# Patient Record
Sex: FEMALE | Race: ASIAN | NOT HISPANIC OR LATINO
[De-identification: names, ages, dates, MRNs, and addresses within clinical notes are randomized per-mention and may not be internally consistent; named-entity substitution may affect disease eponyms.]

---

## 2017-06-27 ENCOUNTER — APPOINTMENT (OUTPATIENT)
Dept: PULMONOLOGY | Facility: CLINIC | Age: 37
End: 2017-06-27

## 2017-06-27 VITALS
TEMPERATURE: 98.4 F | WEIGHT: 133 LBS | HEART RATE: 87 BPM | BODY MASS INDEX: 22.16 KG/M2 | SYSTOLIC BLOOD PRESSURE: 102 MMHG | OXYGEN SATURATION: 94 % | HEIGHT: 65 IN | DIASTOLIC BLOOD PRESSURE: 82 MMHG

## 2017-06-29 RX ORDER — MOMETASONE FUROATE AND FORMOTEROL FUMARATE DIHYDRATE 100; 5 UG/1; UG/1
100-5 AEROSOL RESPIRATORY (INHALATION)
Qty: 1 | Refills: 0 | Status: DISCONTINUED | COMMUNITY
Start: 2017-06-27 | End: 2017-06-29

## 2017-06-29 RX ORDER — ALBUTEROL SULFATE 108 UG/1
108 (90 BASE) AEROSOL, METERED RESPIRATORY (INHALATION) EVERY 6 HOURS
Qty: 1 | Refills: 3 | Status: DISCONTINUED | COMMUNITY
Start: 2017-06-27 | End: 2017-06-29

## 2017-08-29 ENCOUNTER — APPOINTMENT (OUTPATIENT)
Dept: PULMONOLOGY | Facility: CLINIC | Age: 37
End: 2017-08-29
Payer: MEDICAID

## 2017-08-29 VITALS
SYSTOLIC BLOOD PRESSURE: 98 MMHG | BODY MASS INDEX: 22.16 KG/M2 | TEMPERATURE: 98.5 F | WEIGHT: 133 LBS | HEART RATE: 97 BPM | DIASTOLIC BLOOD PRESSURE: 70 MMHG | HEIGHT: 65 IN | OXYGEN SATURATION: 95 %

## 2017-08-29 PROCEDURE — 99213 OFFICE O/P EST LOW 20 MIN: CPT

## 2017-08-30 RX ORDER — FLUTICASONE FUROATE AND VILANTEROL TRIFENATATE 100; 25 UG/1; UG/1
100-25 POWDER RESPIRATORY (INHALATION) DAILY
Qty: 1 | Refills: 5 | Status: DISCONTINUED | COMMUNITY
Start: 2017-06-29 | End: 2017-08-30

## 2017-10-19 ENCOUNTER — APPOINTMENT (OUTPATIENT)
Dept: PULMONOLOGY | Facility: CLINIC | Age: 37
End: 2017-10-19
Payer: MEDICAID

## 2017-10-19 VITALS
SYSTOLIC BLOOD PRESSURE: 96 MMHG | BODY MASS INDEX: 24.32 KG/M2 | OXYGEN SATURATION: 97 % | HEIGHT: 65 IN | HEART RATE: 58 BPM | WEIGHT: 146 LBS | DIASTOLIC BLOOD PRESSURE: 68 MMHG

## 2017-10-19 PROCEDURE — 99214 OFFICE O/P EST MOD 30 MIN: CPT

## 2017-11-14 ENCOUNTER — APPOINTMENT (OUTPATIENT)
Dept: PULMONOLOGY | Facility: CLINIC | Age: 37
End: 2017-11-14
Payer: MEDICAID

## 2017-11-14 VITALS
HEART RATE: 97 BPM | WEIGHT: 140 LBS | SYSTOLIC BLOOD PRESSURE: 98 MMHG | BODY MASS INDEX: 23.3 KG/M2 | OXYGEN SATURATION: 93 % | DIASTOLIC BLOOD PRESSURE: 68 MMHG | TEMPERATURE: 97.9 F

## 2017-11-14 PROCEDURE — 99214 OFFICE O/P EST MOD 30 MIN: CPT

## 2018-01-16 ENCOUNTER — APPOINTMENT (OUTPATIENT)
Dept: PULMONOLOGY | Facility: CLINIC | Age: 38
End: 2018-01-16
Payer: MEDICAID

## 2018-01-16 VITALS
DIASTOLIC BLOOD PRESSURE: 70 MMHG | SYSTOLIC BLOOD PRESSURE: 100 MMHG | BODY MASS INDEX: 24.66 KG/M2 | WEIGHT: 148 LBS | OXYGEN SATURATION: 96 % | HEIGHT: 65 IN | HEART RATE: 69 BPM

## 2018-01-16 PROCEDURE — 99214 OFFICE O/P EST MOD 30 MIN: CPT

## 2018-01-18 RX ORDER — FLUTICASONE PROPIONATE AND SALMETEROL 113; 14 UG/1; UG/1
113-14 POWDER, METERED RESPIRATORY (INHALATION)
Refills: 0 | Status: DISCONTINUED | COMMUNITY
Start: 2017-08-30 | End: 2018-01-18

## 2018-01-18 RX ORDER — BUDESONIDE AND FORMOTEROL FUMARATE DIHYDRATE 160; 4.5 UG/1; UG/1
160-4.5 AEROSOL RESPIRATORY (INHALATION) TWICE DAILY
Qty: 1 | Refills: 3 | Status: DISCONTINUED | COMMUNITY
Start: 2017-11-14 | End: 2018-01-18

## 2018-01-18 RX ORDER — METHYLPREDNISOLONE 4 MG/1
4 TABLET ORAL
Qty: 1 | Refills: 0 | Status: DISCONTINUED | COMMUNITY
Start: 2017-06-27 | End: 2018-01-18

## 2018-01-18 RX ORDER — AZITHROMYCIN DIHYDRATE 250 MG/1
250 TABLET, FILM COATED ORAL
Qty: 1 | Refills: 0 | Status: DISCONTINUED | COMMUNITY
Start: 2017-08-29 | End: 2018-01-18

## 2018-01-18 RX ORDER — PREDNISONE 10 MG/1
10 TABLET ORAL DAILY
Qty: 100 | Refills: 3 | Status: DISCONTINUED | COMMUNITY
Start: 2017-08-29 | End: 2018-01-18

## 2018-04-19 ENCOUNTER — OTHER (OUTPATIENT)
Age: 38
End: 2018-04-19

## 2018-04-19 ENCOUNTER — APPOINTMENT (OUTPATIENT)
Dept: PULMONOLOGY | Facility: CLINIC | Age: 38
End: 2018-04-19
Payer: MEDICAID

## 2018-04-19 VITALS
DIASTOLIC BLOOD PRESSURE: 80 MMHG | HEART RATE: 77 BPM | BODY MASS INDEX: 22.63 KG/M2 | WEIGHT: 136 LBS | SYSTOLIC BLOOD PRESSURE: 100 MMHG | OXYGEN SATURATION: 97 %

## 2018-04-19 PROCEDURE — 99214 OFFICE O/P EST MOD 30 MIN: CPT

## 2018-04-19 RX ORDER — FLUTICASONE FUROATE AND VILANTEROL TRIFENATATE 200; 25 UG/1; UG/1
200-25 POWDER RESPIRATORY (INHALATION)
Refills: 0 | Status: DISCONTINUED | COMMUNITY
Start: 2018-01-18 | End: 2018-04-19

## 2018-05-22 RX ORDER — FLUTICASONE PROPIONATE 50 UG/1
50 SPRAY, METERED NASAL TWICE DAILY
Qty: 3 | Refills: 0 | Status: ACTIVE | COMMUNITY
Start: 2018-05-22 | End: 1900-01-01

## 2018-05-29 ENCOUNTER — APPOINTMENT (OUTPATIENT)
Dept: PULMONOLOGY | Facility: CLINIC | Age: 38
End: 2018-05-29
Payer: MEDICAID

## 2018-05-29 VITALS
OXYGEN SATURATION: 95 % | SYSTOLIC BLOOD PRESSURE: 104 MMHG | DIASTOLIC BLOOD PRESSURE: 64 MMHG | WEIGHT: 135 LBS | BODY MASS INDEX: 22.47 KG/M2 | TEMPERATURE: 97.9 F | HEART RATE: 67 BPM

## 2018-05-29 PROCEDURE — 99214 OFFICE O/P EST MOD 30 MIN: CPT

## 2018-10-18 ENCOUNTER — APPOINTMENT (OUTPATIENT)
Dept: PULMONOLOGY | Facility: CLINIC | Age: 38
End: 2018-10-18
Payer: MEDICAID

## 2018-10-18 VITALS
OXYGEN SATURATION: 95 % | DIASTOLIC BLOOD PRESSURE: 70 MMHG | BODY MASS INDEX: 23.3 KG/M2 | SYSTOLIC BLOOD PRESSURE: 118 MMHG | WEIGHT: 140 LBS | HEART RATE: 76 BPM

## 2018-10-18 PROCEDURE — 99214 OFFICE O/P EST MOD 30 MIN: CPT

## 2018-10-18 RX ORDER — FLUTICASONE FUROATE AND VILANTEROL TRIFENATATE 200; 25 UG/1; UG/1
200-25 POWDER RESPIRATORY (INHALATION)
Qty: 1 | Refills: 3 | Status: ACTIVE | COMMUNITY
Start: 2018-10-18 | End: 1900-01-01

## 2018-10-18 RX ORDER — MONTELUKAST SODIUM 10 MG/1
10 TABLET, FILM COATED ORAL
Qty: 3 | Refills: 3 | Status: ACTIVE | COMMUNITY
Start: 2017-06-27 | End: 1900-01-01

## 2018-10-18 RX ORDER — BECLOMETHASONE DIPROPIONATE 40 UG/1
40 AEROSOL, METERED RESPIRATORY (INHALATION) TWICE DAILY
Qty: 1 | Refills: 0 | Status: DISCONTINUED | OUTPATIENT
Start: 2018-05-29 | End: 2018-10-18

## 2019-01-24 ENCOUNTER — APPOINTMENT (OUTPATIENT)
Dept: PULMONOLOGY | Facility: CLINIC | Age: 39
End: 2019-01-24
Payer: COMMERCIAL

## 2019-01-24 VITALS
WEIGHT: 140 LBS | OXYGEN SATURATION: 93 % | BODY MASS INDEX: 23.3 KG/M2 | HEART RATE: 82 BPM | DIASTOLIC BLOOD PRESSURE: 74 MMHG | SYSTOLIC BLOOD PRESSURE: 104 MMHG

## 2019-01-24 PROCEDURE — 99214 OFFICE O/P EST MOD 30 MIN: CPT

## 2019-01-27 NOTE — PHYSICAL EXAM
[General Appearance - Well Developed] : well developed [Normal Appearance] : normal appearance [Well Groomed] : well groomed [General Appearance - Well Nourished] : well nourished [No Deformities] : no deformities [General Appearance - In No Acute Distress] : no acute distress [Normal Conjunctiva] : the conjunctiva exhibited no abnormalities [Eyelids - No Xanthelasma] : the eyelids demonstrated no xanthelasmas [Normal Oropharynx] : normal oropharynx [Neck Appearance] : the appearance of the neck was normal [Neck Cervical Mass (___cm)] : no neck mass was observed [Jugular Venous Distention Increased] : there was no jugular-venous distention [Thyroid Diffuse Enlargement] : the thyroid was not enlarged [Thyroid Nodule] : there were no palpable thyroid nodules [Heart Rate And Rhythm] : heart rate and rhythm were normal [Heart Sounds] : normal S1 and S2 [Murmurs] : no murmurs present [Respiration, Rhythm And Depth] : normal respiratory rhythm and effort [Exaggerated Use Of Accessory Muscles For Inspiration] : no accessory muscle use [Decreased Breath Sounds] : decreased breath sounds [Abdomen Soft] : soft [Abdomen Tenderness] : non-tender [Abdomen Mass (___ Cm)] : no abdominal mass palpated [Nail Clubbing] : no clubbing of the fingernails [Cyanosis, Localized] : no localized cyanosis [Petechial Hemorrhages (___cm)] : no petechial hemorrhages [Skin Color & Pigmentation] : normal skin color and pigmentation [] : no rash [No Venous Stasis] : no venous stasis [Skin Lesions] : no skin lesions [No Skin Ulcers] : no skin ulcer [No Xanthoma] : no  xanthoma was observed [No Focal Deficits] : no focal deficits [Oriented To Time, Place, And Person] : oriented to person, place, and time [Impaired Insight] : insight and judgment were intact [Affect] : the affect was normal

## 2019-01-27 NOTE — DISCUSSION/SUMMARY
[FreeTextEntry1] : 40 yo female with stable asthma- COPD overlap syndrome. She is to continue use of singualir 10 mg and breo 200 daily with PRN albuterol MDI. Treatment adjustment will depend on symptomatic needs.

## 2019-01-27 NOTE — REVIEW OF SYSTEMS
[Cough] : no cough [Sputum] : not coughing up ~M sputum [Hemoptysis] : no hemoptysis [Dyspnea] : no dyspnea [Wheezing] : wheezing [Arthralgias] : arthralgias [Negative] : Sleep Disorder

## 2019-01-27 NOTE — HISTORY OF PRESENT ILLNESS
[FreeTextEntry1] : 38 yo female with hx of asthma- COPD overlap syndrome presents for follow up. The patient is "OK" on daily singualir with PRN breo 200 and albuterol MDI. Her symptoms are worse with exercise and with the cold weather. She denies chest pain or hemoptysis.

## 2019-05-03 ENCOUNTER — APPOINTMENT (OUTPATIENT)
Dept: PULMONOLOGY | Facility: CLINIC | Age: 39
End: 2019-05-03
Payer: COMMERCIAL

## 2019-05-03 VITALS
HEART RATE: 63 BPM | BODY MASS INDEX: 23.3 KG/M2 | DIASTOLIC BLOOD PRESSURE: 70 MMHG | WEIGHT: 140 LBS | OXYGEN SATURATION: 96 % | SYSTOLIC BLOOD PRESSURE: 108 MMHG

## 2019-05-03 PROCEDURE — 99214 OFFICE O/P EST MOD 30 MIN: CPT

## 2019-05-03 NOTE — DISCUSSION/SUMMARY
[FreeTextEntry1] : 38 yo female with stable lung exam (asthma- COPD overlap syndrome). She is to increase breo to once daily if symptoms worsen. OTC antihistamines PRN.

## 2019-05-03 NOTE — HISTORY OF PRESENT ILLNESS
[FreeTextEntry1] : 38 yo female with hx of asthma- COPD overlap syndrome presents for follow up. The patient feels "OK", on daily singulair with every other day breo. She uses ventolin MDI with exercise.

## 2019-05-03 NOTE — PHYSICAL EXAM
[General Appearance - Well Developed] : well developed [Normal Appearance] : normal appearance [Well Groomed] : well groomed [General Appearance - Well Nourished] : well nourished [General Appearance - In No Acute Distress] : no acute distress [No Deformities] : no deformities [Normal Conjunctiva] : the conjunctiva exhibited no abnormalities [Eyelids - No Xanthelasma] : the eyelids demonstrated no xanthelasmas [Normal Oropharynx] : normal oropharynx [Neck Cervical Mass (___cm)] : no neck mass was observed [Neck Appearance] : the appearance of the neck was normal [Jugular Venous Distention Increased] : there was no jugular-venous distention [Thyroid Diffuse Enlargement] : the thyroid was not enlarged [Thyroid Nodule] : there were no palpable thyroid nodules [Heart Rate And Rhythm] : heart rate and rhythm were normal [Heart Sounds] : normal S1 and S2 [Murmurs] : no murmurs present [Respiration, Rhythm And Depth] : normal respiratory rhythm and effort [Exaggerated Use Of Accessory Muscles For Inspiration] : no accessory muscle use [Decreased Breath Sounds] : decreased breath sounds [Abdomen Soft] : soft [Abdomen Tenderness] : non-tender [Abdomen Mass (___ Cm)] : no abdominal mass palpated [Nail Clubbing] : no clubbing of the fingernails [Cyanosis, Localized] : no localized cyanosis [Petechial Hemorrhages (___cm)] : no petechial hemorrhages [Skin Color & Pigmentation] : normal skin color and pigmentation [] : no rash [No Skin Ulcers] : no skin ulcer [No Venous Stasis] : no venous stasis [Skin Lesions] : no skin lesions [No Xanthoma] : no  xanthoma was observed [Oriented To Time, Place, And Person] : oriented to person, place, and time [No Focal Deficits] : no focal deficits [Affect] : the affect was normal [Impaired Insight] : insight and judgment were intact

## 2019-09-17 ENCOUNTER — APPOINTMENT (OUTPATIENT)
Dept: PULMONOLOGY | Facility: CLINIC | Age: 39
End: 2019-09-17
Payer: MEDICAID

## 2019-09-17 VITALS
OXYGEN SATURATION: 95 % | WEIGHT: 143 LBS | SYSTOLIC BLOOD PRESSURE: 100 MMHG | HEART RATE: 84 BPM | BODY MASS INDEX: 23.8 KG/M2 | DIASTOLIC BLOOD PRESSURE: 72 MMHG

## 2019-09-17 PROCEDURE — 99214 OFFICE O/P EST MOD 30 MIN: CPT

## 2019-09-22 NOTE — HISTORY OF PRESENT ILLNESS
[FreeTextEntry1] : 40 yo female with asthma- COPD overlap syndrome, presents for follow up. The patient is "OK" on daily breo, montelukast and albuterol MDI PRN. She is concerned over rash which she "thinks" may be related to meds.

## 2019-09-22 NOTE — REVIEW OF SYSTEMS
[Cough] : no cough [Sputum] : not coughing up ~M sputum [Hemoptysis] : no hemoptysis [Wheezing] : wheezing [Dyspnea] : no dyspnea [Arthralgias] : arthralgias [Negative] : Sleep Disorder

## 2019-09-22 NOTE — DISCUSSION/SUMMARY
[FreeTextEntry1] : 38 yo female with stable OAD. She is to continue meds as before. Adjustment will depend on symptomatic needs. She is to follow up with dermatology . It is unclear if the rash is related to med use. SHe is to follow up with her PMD as before and for the influenza vaccine.

## 2019-09-22 NOTE — PHYSICAL EXAM
[General Appearance - Well Developed] : well developed [Normal Appearance] : normal appearance [Well Groomed] : well groomed [General Appearance - Well Nourished] : well nourished [No Deformities] : no deformities [General Appearance - In No Acute Distress] : no acute distress [Normal Conjunctiva] : the conjunctiva exhibited no abnormalities [Normal Oropharynx] : normal oropharynx [Eyelids - No Xanthelasma] : the eyelids demonstrated no xanthelasmas [Neck Appearance] : the appearance of the neck was normal [Neck Cervical Mass (___cm)] : no neck mass was observed [Jugular Venous Distention Increased] : there was no jugular-venous distention [Thyroid Nodule] : there were no palpable thyroid nodules [Thyroid Diffuse Enlargement] : the thyroid was not enlarged [Heart Sounds] : normal S1 and S2 [Heart Rate And Rhythm] : heart rate and rhythm were normal [Murmurs] : no murmurs present [Respiration, Rhythm And Depth] : normal respiratory rhythm and effort [Exaggerated Use Of Accessory Muscles For Inspiration] : no accessory muscle use [Decreased Breath Sounds] : decreased breath sounds [Abdomen Soft] : soft [Abdomen Tenderness] : non-tender [Abdomen Mass (___ Cm)] : no abdominal mass palpated [Cyanosis, Localized] : no localized cyanosis [Nail Clubbing] : no clubbing of the fingernails [Petechial Hemorrhages (___cm)] : no petechial hemorrhages [] : no ischemic changes [Skin Color & Pigmentation] : normal skin color and pigmentation [No Venous Stasis] : no venous stasis [No Skin Ulcers] : no skin ulcer [No Xanthoma] : no  xanthoma was observed [Face] : on the face [Macular Rash] : A macular rash was noted [Trunk] : on the trunk [No Focal Deficits] : no focal deficits [Oriented To Time, Place, And Person] : oriented to person, place, and time [Impaired Insight] : insight and judgment were intact [Affect] : the affect was normal

## 2019-12-17 ENCOUNTER — TRANSCRIPTION ENCOUNTER (OUTPATIENT)
Age: 39
End: 2019-12-17

## 2020-05-21 ENCOUNTER — APPOINTMENT (OUTPATIENT)
Dept: PULMONOLOGY | Facility: CLINIC | Age: 40
End: 2020-05-21
Payer: MEDICAID

## 2020-05-21 VITALS
SYSTOLIC BLOOD PRESSURE: 108 MMHG | HEART RATE: 63 BPM | TEMPERATURE: 99 F | BODY MASS INDEX: 22.8 KG/M2 | OXYGEN SATURATION: 97 % | DIASTOLIC BLOOD PRESSURE: 72 MMHG | WEIGHT: 137 LBS

## 2020-05-21 PROCEDURE — 99214 OFFICE O/P EST MOD 30 MIN: CPT

## 2020-05-21 RX ORDER — MONTELUKAST 10 MG/1
10 TABLET, FILM COATED ORAL
Qty: 90 | Refills: 3 | Status: ACTIVE | COMMUNITY
Start: 2019-09-17 | End: 1900-01-01

## 2020-05-21 RX ORDER — FLUTICASONE FUROATE AND VILANTEROL TRIFENATATE 200; 25 UG/1; UG/1
200-25 POWDER RESPIRATORY (INHALATION)
Qty: 1 | Refills: 3 | Status: ACTIVE | COMMUNITY
Start: 2019-05-03 | End: 1900-01-01

## 2020-05-25 NOTE — HISTORY OF PRESENT ILLNESS
[TextBox_4] : 39 yo female with hx of OAD (asthma- COPD overlap syndrome), presents for follow up. The patient feels "good" on daily montelukast with every other day use of breo  with rare albuterol MDI use. She denies fever, chest pain or hemoptysis.The patient complains of allergies during spring time.

## 2020-05-25 NOTE — DISCUSSION/SUMMARY
[FreeTextEntry1] : 41 yo female with stable OAD. She is to continue treatment as before, to include montelukast given seasonal allergies. Treatment adjustment will depend on symptomatic needs. She is to follow up with her PMD as before.

## 2020-10-05 RX ORDER — ALBUTEROL SULFATE 90 UG/1
108 (90 BASE) AEROSOL, METERED RESPIRATORY (INHALATION)
Qty: 2 | Refills: 1 | Status: ACTIVE | COMMUNITY
Start: 2017-06-29 | End: 1900-01-01

## 2020-10-05 RX ORDER — MOMETASONE 50 UG/1
50 SPRAY, METERED NASAL TWICE DAILY
Qty: 1 | Refills: 3 | Status: ACTIVE | COMMUNITY
Start: 2018-05-23 | End: 1900-01-01

## 2020-11-12 ENCOUNTER — APPOINTMENT (OUTPATIENT)
Dept: PULMONOLOGY | Facility: CLINIC | Age: 40
End: 2020-11-12
Payer: MEDICAID

## 2020-11-12 VITALS
RESPIRATION RATE: 17 BRPM | BODY MASS INDEX: 22.3 KG/M2 | DIASTOLIC BLOOD PRESSURE: 60 MMHG | HEART RATE: 92 BPM | SYSTOLIC BLOOD PRESSURE: 90 MMHG | WEIGHT: 134 LBS | OXYGEN SATURATION: 91 % | TEMPERATURE: 97 F

## 2020-11-12 PROCEDURE — 99212 OFFICE O/P EST SF 10 MIN: CPT

## 2020-11-12 RX ORDER — FLUTICASONE PROPIONATE 50 UG/1
50 SPRAY, METERED NASAL TWICE DAILY
Qty: 1 | Refills: 5 | Status: ACTIVE | COMMUNITY
Start: 2020-11-12 | End: 1900-01-01

## 2020-11-16 NOTE — PHYSICAL EXAM
[No Acute Distress] : no acute distress [Normal Oropharynx] : normal oropharynx [Normal Appearance] : normal appearance [No Neck Mass] : no neck mass [Normal Rate/Rhythm] : normal rate/rhythm [Normal S1, S2] : normal s1, s2 [No Murmurs] : no murmurs [No Resp Distress] : no resp distress [Clear to Auscultation Bilaterally] : clear to auscultation bilaterally [No Abnormalities] : no abnormalities [Benign] : benign [Normal Gait] : normal gait [No Clubbing] : no clubbing [No Cyanosis] : no cyanosis [No Edema] : no edema [FROM] : FROM [Normal Color/ Pigmentation] : normal color/ pigmentation [No Focal Deficits] : no focal deficits [Oriented x3] : oriented x3 [Normal Affect] : normal affect [TextBox_68] : Decreased BS bilaterally

## 2020-11-16 NOTE — DISCUSSION/SUMMARY
[FreeTextEntry1] : 41 yo female with OAD at baseline on montelukast daily with PRN ICS/LABA and albuterol MDI. Treatment adjustment will depend on symptomatic needs. The pneumococcal vaccine will be administered next visit.

## 2020-11-16 NOTE — HISTORY OF PRESENT ILLNESS
[Former] : former [< 30 pack-years] : < 30 pack-years [TextBox_4] : 39 yo female with hx of OAD (asthma- COPD overlap syndrome) presents for follow up. The patient feels "good" on PRN breo and albuterol with daily montelukast. She denies cough, chest pain or hemoptysis. She has received the influenza vaccine [YearQuit] : 2017

## 2021-05-13 ENCOUNTER — APPOINTMENT (OUTPATIENT)
Dept: PULMONOLOGY | Facility: CLINIC | Age: 41
End: 2021-05-13
Payer: MEDICAID

## 2021-05-13 VITALS
DIASTOLIC BLOOD PRESSURE: 62 MMHG | HEART RATE: 65 BPM | WEIGHT: 136 LBS | OXYGEN SATURATION: 97 % | SYSTOLIC BLOOD PRESSURE: 98 MMHG | BODY MASS INDEX: 22.63 KG/M2 | TEMPERATURE: 99.1 F

## 2021-05-13 DIAGNOSIS — Z87.891 PERSONAL HISTORY OF NICOTINE DEPENDENCE: ICD-10-CM

## 2021-05-13 DIAGNOSIS — F17.200 NICOTINE DEPENDENCE, UNSPECIFIED, UNCOMPLICATED: ICD-10-CM

## 2021-05-13 PROCEDURE — 99213 OFFICE O/P EST LOW 20 MIN: CPT

## 2021-05-31 PROBLEM — Z87.891 FORMER CIGARETTE SMOKER: Status: ACTIVE | Noted: 2021-05-31

## 2021-05-31 NOTE — DISCUSSION/SUMMARY
[FreeTextEntry1] : 40 yo female with stable pulmonary exam. The patient will stop montelukast use with continued PRN use of albuterol MDI alone for now. Treatment adjustment will depend on symptomatic needs. PFT will be performed in the future. She is to follow up with her PMD as before.

## 2021-05-31 NOTE — HISTORY OF PRESENT ILLNESS
[Former] : former [< 30 pack-years] : < 30 pack-years [YearQuit] : 2017 [TextBox_4] : 42 yo female with hx of OAD (asthma- COPD overlap) presents for follow up. The patient feels "good" complaining of PRN RICCI for which she uses albuterol MDI PRN with daily montelukast. She has not used ICS/LABA for a "long time". She has received the covid 19 vaccine. [TextBox_29] : Denies snoring, daytime somnolence, apneic episodes, AM headaches

## 2021-06-08 RX ORDER — MONTELUKAST 10 MG/1
10 TABLET, FILM COATED ORAL
Qty: 90 | Refills: 1 | Status: ACTIVE | COMMUNITY
Start: 2021-06-08 | End: 1900-01-01

## 2021-11-11 ENCOUNTER — APPOINTMENT (OUTPATIENT)
Dept: PULMONOLOGY | Facility: CLINIC | Age: 41
End: 2021-11-11
Payer: MEDICAID

## 2021-11-11 PROCEDURE — 99213 OFFICE O/P EST LOW 20 MIN: CPT

## 2021-11-12 VITALS
SYSTOLIC BLOOD PRESSURE: 100 MMHG | RESPIRATION RATE: 16 BRPM | TEMPERATURE: 98 F | WEIGHT: 127 LBS | DIASTOLIC BLOOD PRESSURE: 70 MMHG | HEART RATE: 80 BPM | OXYGEN SATURATION: 97 % | BODY MASS INDEX: 21.16 KG/M2 | HEIGHT: 65 IN

## 2021-11-28 NOTE — DISCUSSION/SUMMARY
[FreeTextEntry1] : 42 yo female with stable OAD on montelukast, breo and albuterol MDI. Treatment adjustment as clinically indicated. PFT will be repeated in the future. She is to follow up with her PMD as before.

## 2021-11-28 NOTE — HISTORY OF PRESENT ILLNESS
[TextBox_4] : 42 yo female with hx of OAD (asthma- COPD overlap) presents for follow up. The patient is "better" on breo and montelukast daily with PRN albuterol MDI. She denies cough, SOB or chest pain. She is covid 19 vaccinated.

## 2022-05-12 ENCOUNTER — NON-APPOINTMENT (OUTPATIENT)
Age: 42
End: 2022-05-12

## 2022-05-12 ENCOUNTER — APPOINTMENT (OUTPATIENT)
Dept: PULMONOLOGY | Facility: CLINIC | Age: 42
End: 2022-05-12
Payer: MEDICAID

## 2022-05-12 VITALS
TEMPERATURE: 98 F | HEART RATE: 82 BPM | SYSTOLIC BLOOD PRESSURE: 106 MMHG | DIASTOLIC BLOOD PRESSURE: 80 MMHG | WEIGHT: 132 LBS | BODY MASS INDEX: 21.97 KG/M2 | OXYGEN SATURATION: 98 %

## 2022-05-12 DIAGNOSIS — J31.0 CHRONIC RHINITIS: ICD-10-CM

## 2022-05-12 PROCEDURE — 94727 GAS DIL/WSHOT DETER LNG VOL: CPT

## 2022-05-12 PROCEDURE — 99214 OFFICE O/P EST MOD 30 MIN: CPT | Mod: 25

## 2022-05-12 PROCEDURE — 94060 EVALUATION OF WHEEZING: CPT

## 2022-05-12 PROCEDURE — 94729 DIFFUSING CAPACITY: CPT

## 2022-06-25 PROBLEM — J31.0 CHRONIC RHINITIS: Status: ACTIVE | Noted: 2018-05-22

## 2022-06-25 NOTE — HISTORY OF PRESENT ILLNESS
[Former] : former [< 20 pack-years] : < 20 pack-years [TextBox_4] : 43 yo female with hx of OAD (asthma- COPD overlap) presents for follow up. The patient is "OK" on  montelukast daily alone with rare albuterol MDI. She denies cough, SOB or chest pain.  [TextBox_29] : Denies snoring, daytime somnolence, apneic episodes, AM headaches

## 2022-06-25 NOTE — DISCUSSION/SUMMARY
[FreeTextEntry1] : 43 yo female with OAD at baseline on montelukast and albuterol MDI alone.I reviewed the PFT results with the patient. Treatment adjustment will depend on symptomatic needs. PFT will be repeated in the future. She is to follow up with her PMD as before.

## 2022-09-19 ENCOUNTER — TRANSCRIPTION ENCOUNTER (OUTPATIENT)
Age: 42
End: 2022-09-19

## 2022-09-19 RX ORDER — ALBUTEROL SULFATE 90 UG/1
108 (90 BASE) AEROSOL, METERED RESPIRATORY (INHALATION)
Qty: 3 | Refills: 1 | Status: ACTIVE | COMMUNITY
Start: 2019-05-03 | End: 1900-01-01

## 2022-09-19 RX ORDER — FLUTICASONE FUROATE AND VILANTEROL TRIFENATATE 100; 25 UG/1; UG/1
100-25 POWDER RESPIRATORY (INHALATION) DAILY
Qty: 3 | Refills: 3 | Status: ACTIVE | COMMUNITY
Start: 2019-09-17 | End: 1900-01-01

## 2022-11-10 ENCOUNTER — APPOINTMENT (OUTPATIENT)
Dept: PULMONOLOGY | Facility: CLINIC | Age: 42
End: 2022-11-10
Payer: MEDICAID

## 2022-11-10 VITALS
TEMPERATURE: 98.6 F | DIASTOLIC BLOOD PRESSURE: 80 MMHG | HEART RATE: 78 BPM | SYSTOLIC BLOOD PRESSURE: 118 MMHG | BODY MASS INDEX: 21.97 KG/M2 | OXYGEN SATURATION: 98 % | WEIGHT: 132 LBS

## 2022-11-10 DIAGNOSIS — R05.9 COUGH, UNSPECIFIED: ICD-10-CM

## 2022-11-10 PROCEDURE — 99214 OFFICE O/P EST MOD 30 MIN: CPT

## 2022-11-15 ENCOUNTER — TRANSCRIPTION ENCOUNTER (OUTPATIENT)
Age: 42
End: 2022-11-15

## 2022-11-15 RX ORDER — MONTELUKAST 10 MG/1
10 TABLET, FILM COATED ORAL
Qty: 90 | Refills: 3 | Status: ACTIVE | OUTPATIENT
Start: 2021-11-11

## 2022-11-15 RX ORDER — MONTELUKAST 10 MG/1
10 TABLET, FILM COATED ORAL
Qty: 90 | Refills: 3 | Status: ACTIVE | COMMUNITY
Start: 2022-11-15 | End: 1900-01-01

## 2023-02-04 PROBLEM — R05.9 COUGH: Status: ACTIVE | Noted: 2017-06-27

## 2023-02-04 NOTE — HISTORY OF PRESENT ILLNESS
[Former] : former [< 20 pack-years] : < 20 pack-years [TextBox_4] : 41 yo female with hx of OAD (asthma- COPD overlap) presents for follow up. The patient feels better on montelukast alone without albuterol use. She denies cough or SOB. [TextBox_29] : Denies snoring, daytime somnolence, apneic episodes, AM headaches

## 2023-02-04 NOTE — DISCUSSION/SUMMARY
[FreeTextEntry1] : 43 yo female with hx of OAD at baseline on ICS/LABA and albuterol MDI. Treatment adjustment will depend on symptomatic needs.She is to follow up with her PMD as before.

## 2023-05-06 ENCOUNTER — NON-APPOINTMENT (OUTPATIENT)
Age: 43
End: 2023-05-06

## 2023-05-11 ENCOUNTER — APPOINTMENT (OUTPATIENT)
Dept: PULMONOLOGY | Facility: CLINIC | Age: 43
End: 2023-05-11
Payer: MEDICAID

## 2023-05-11 VITALS
OXYGEN SATURATION: 99 % | DIASTOLIC BLOOD PRESSURE: 80 MMHG | WEIGHT: 138 LBS | SYSTOLIC BLOOD PRESSURE: 108 MMHG | BODY MASS INDEX: 22.96 KG/M2 | HEART RATE: 82 BPM | TEMPERATURE: 98.2 F

## 2023-05-11 DIAGNOSIS — R06.09 OTHER FORMS OF DYSPNEA: ICD-10-CM

## 2023-05-11 DIAGNOSIS — J44.9 CHRONIC OBSTRUCTIVE PULMONARY DISEASE, UNSPECIFIED: ICD-10-CM

## 2023-05-11 PROCEDURE — 99214 OFFICE O/P EST MOD 30 MIN: CPT

## 2023-07-31 PROBLEM — R06.09 DOE (DYSPNEA ON EXERTION): Status: ACTIVE | Noted: 2023-07-31

## 2023-07-31 PROBLEM — R06.09 DOE (DYSPNEA ON EXERTION): Status: RESOLVED | Noted: 2021-05-31 | Resolved: 2023-07-31

## 2023-07-31 PROBLEM — J44.9 ASTHMA WITH COPD: Status: ACTIVE | Noted: 2017-06-27

## 2023-07-31 PROBLEM — J44.9 OAD (OBSTRUCTIVE AIRWAY DISEASE): Status: ACTIVE | Noted: 2023-07-31

## 2023-07-31 NOTE — DISCUSSION/SUMMARY
[FreeTextEntry1] : 42 yo female with hx of OAD at baseline. She is to continue montelukast daily as before. Treatment adjustment will depend on symptomatic needs. PFT will be repeated in the future.

## 2023-07-31 NOTE — HISTORY OF PRESENT ILLNESS
[Never] : never [TextBox_4] : 44 yo female with hx of OAD (asthma-COPD overlap ) presents for follow up. The patient feels "good" on montelukast daily alone. She denies cough or chest pain. [TextBox_29] : Denies snoring, daytime somnolence, apneic episodes, AM headaches

## 2023-11-09 ENCOUNTER — APPOINTMENT (OUTPATIENT)
Dept: PULMONOLOGY | Facility: CLINIC | Age: 43
End: 2023-11-09
Payer: COMMERCIAL

## 2023-11-09 VITALS
TEMPERATURE: 98.2 F | SYSTOLIC BLOOD PRESSURE: 122 MMHG | WEIGHT: 136 LBS | BODY MASS INDEX: 22.63 KG/M2 | OXYGEN SATURATION: 98 % | HEART RATE: 90 BPM | DIASTOLIC BLOOD PRESSURE: 80 MMHG

## 2023-11-09 PROCEDURE — 99214 OFFICE O/P EST MOD 30 MIN: CPT

## 2023-11-09 RX ORDER — MONTELUKAST 10 MG/1
10 TABLET, FILM COATED ORAL
Qty: 90 | Refills: 3 | Status: ACTIVE | COMMUNITY
Start: 2023-11-09 | End: 1900-01-01

## 2023-11-09 RX ORDER — ALBUTEROL SULFATE 90 UG/1
108 (90 BASE) AEROSOL, METERED RESPIRATORY (INHALATION)
Qty: 1 | Refills: 1 | Status: ACTIVE | COMMUNITY
Start: 2023-05-11 | End: 1900-01-01

## 2024-01-07 NOTE — HISTORY OF PRESENT ILLNESS
[TextBox_4] : 43-year-old female with history of asthma-COPD overlap syndrome, presents for follow-up.  Patient feels "good", presently using montelukast every other day with occasional albuterol use.  She has occasional productive cough but denies fever, chest pain, or hemoptysis. [TextBox_29] : Denies snoring, daytime somnolence, apneic episodes, AM headaches

## 2024-05-09 ENCOUNTER — APPOINTMENT (OUTPATIENT)
Dept: PULMONOLOGY | Facility: CLINIC | Age: 44
End: 2024-05-09
Payer: COMMERCIAL

## 2024-05-09 VITALS
WEIGHT: 132 LBS | OXYGEN SATURATION: 99 % | TEMPERATURE: 98.2 F | HEART RATE: 86 BPM | SYSTOLIC BLOOD PRESSURE: 110 MMHG | BODY MASS INDEX: 21.97 KG/M2 | DIASTOLIC BLOOD PRESSURE: 70 MMHG

## 2024-05-09 PROCEDURE — 99214 OFFICE O/P EST MOD 30 MIN: CPT

## 2024-05-09 RX ORDER — MONTELUKAST 10 MG/1
10 TABLET, FILM COATED ORAL
Qty: 90 | Refills: 3 | Status: ACTIVE | COMMUNITY
Start: 2024-05-09 | End: 1900-01-01

## 2024-05-09 RX ORDER — ALBUTEROL SULFATE 90 UG/1
108 (90 BASE) INHALANT RESPIRATORY (INHALATION)
Qty: 1 | Refills: 3 | Status: ACTIVE | COMMUNITY
Start: 2024-05-09 | End: 1900-01-01

## 2024-05-10 NOTE — HISTORY OF PRESENT ILLNESS
[Never] : never [TextBox_4] : 44-year-old female with history of OAD (asthma-COPD overlap) presents for follow-up.  The patient is "well" on daily montelukast alone with rare albuterol use.  She denies any cough, shortness of breath, chills, chest pain or hemoptysis.  She has occasional postnasal drip [TextBox_29] : Denies snoring, daytime somnolence, apneic episodes, AM headaches

## 2024-05-10 NOTE — DISCUSSION/SUMMARY
[FreeTextEntry1] : 44-year-old female with history of OAD (asthma-COPD overlap syndrome) at baseline.  She is continue montelukast daily with albuterol as needed.  Treatment adjustment will depend on symptomatic needs.  PFTs will be performed in the future.  She is to follow-up with her PMD as before.

## 2024-09-17 NOTE — DISCUSSION/SUMMARY
Addended by: DORA PEREA on: 9/17/2024 12:15 PM     Modules accepted: Orders     [FreeTextEntry1] : 43-year-old female with history of asthma-COPD overlap syndrome, stable on minimal medication.  She is to continue montelukast daily and use albuterol as needed.  Treatment adjustment will depend on symptomatic needs.  Patient has received her influenza vaccine.  She is to follow-up with her PMD as before.